# Patient Record
Sex: MALE | Race: WHITE | Employment: FULL TIME | URBAN - METROPOLITAN AREA
[De-identification: names, ages, dates, MRNs, and addresses within clinical notes are randomized per-mention and may not be internally consistent; named-entity substitution may affect disease eponyms.]

---

## 2017-02-08 DIAGNOSIS — H90.3 SENSORINEURAL HEARING LOSS, BILATERAL: Primary | ICD-10-CM

## 2017-02-22 ENCOUNTER — OFFICE VISIT (OUTPATIENT)
Dept: AUDIOLOGY | Facility: CLINIC | Age: 57
End: 2017-02-22

## 2017-02-22 DIAGNOSIS — H90.3 SENSORY HEARING LOSS, BILATERAL: Primary | ICD-10-CM

## 2017-02-22 NOTE — MR AVS SNAPSHOT
After Visit Summary   2017    Mark Adkins    MRN: [id number]           Patient Information     Date Of Birth          1960        Visit Information        Provider Department      2017 8:00 AM Rosangela Rojas AuD M [facility] Audiology         Follow-ups after your visit        Your next 10 appointments already scheduled     May 24, 2017  8:00 AM CDT   Cochlear Implant Return with Kenna Garner [facility]iology ([facility])    [address] [place] [address] [phone]              Who to contact     Please call your clinic at [phone] to:    Ask questions about your health    Make or cancel appointments    Discuss your medicines    Learn about your test results    Speak to your doctor   If you have compliments or concerns about an experience at your clinic, or if you wish to file a complaint, please contact [facility] Patient Relations at [phone] or email us at [email or web address]         Additional Information About Your Visit        MyChart Information     Rewardable is an electronic gateway that provides easy, online access to your medical records. With Rewardable, you can request a clinic appointment, read your test results, renew a prescription or communicate with your care team.     To sign up for Rewardable visit the website at [email or web address]   You will be asked to enter the access code listed below, as well as some personal information. Please follow the directions to create your username and password.     Your access code is: [email or web address]  Expires: 2017  6:30 AM     Your access code will  in 90 days. If you need help or a new code, please contact your [facility] or call [phone] for assistance.        Care EveryWhere ID     This is your Care EveryWhere ID. This could be used by other organizations to access your [place]  medical records  [id number]         Blood Pressure from Last 3 Encounters:   No data found for BP    Weight from Last 3 Encounters:   No data found for Wt              Today, you had the following     No orders found for display       Primary Care Provider Office Phone # Fax #    Elkin Mcneal [phone]       [place] [address] [facility] [address]        Thank you!     Thank you for choosing [facility] AUDIOLOGY  for your care. Our goal is always to provide you with excellent care. Hearing back from our patients is one way we can continue to improve our services. Please take a few minutes to complete the written survey that you may receive in the mail after your visit with us. Thank you!             Your Updated Medication List - Protect others around you: Learn how to safely use, store and throw away your medicines at www.disposemymeds.org.      Notice  As of 2/22/2017  8:52 AM    You have not been prescribed any medications.

## 2017-02-22 NOTE — PROGRESS NOTES
AUDIOLOGY REPORT    BACKGROUND INFORMATION: Dr. Elkin Varghese implanted Mark Adkins with a left  Advanced Pileus Software 90k cochlear implant (CI) on 10/11/10 due to severe to profound sensorineural hearing loss bilaterally and lack of benefit from hearing aids.  Today's visit was ordered by Dr. Elkin Varghese.      PATIENT REPORT: Mark was seen in Audiology at the [facility] on 2/22/17 to upgrade to the Cat CI Q90 sound processor.  He did not select a ComPilot with his upgrade kit, as he preferred to have extra spare parts.  If he wishes to order a ComPilot or trade the spare parts for a ComPilot, he will contact [employer].      FITTING SESSION: The patient came to the clinic for adjustment to the programs in the external speech processor and for assessment of the external components of the cochlear implant system. These components provide power and data to the internal device. Sound is only heard once the external portion is activated. Postoperative treatment, including device fitting and adjustment, audiologic assessments, and training are required at regular intervals.     Processor type: Cat CI Q90 fit today.  [employer] and PSP will become backups.    Headpiece type: Universal headpiece for Cat CI - 1 magnet, HR 90K for More[employer] -standard magnet, and PHP for PHP - 2 magnets, no irritation from magnets    TEST RESULTS:   Electrode Impedances: stable and within tolerances (electrodes 1 & 16 are off)  Neural Response Testing: Did not test  Facial Stimulation: Absent  Tinnitus: Absent  Balance Problems: Absent  Pain/Discomfort: Absent  Strategies: HiResolution S 120 in Morehouse and PSP, Tolar S in Cat CI Q90  Clear Voice medium in More[place] and Cat (not available in PSP).     Programs in Cat CI Q90 (Tolar S):  1. (75) Everyday program, IDR 65, Tmic only   2.  (76) AutoUltraZoom, IDR 65, Tmic as startup/default  3. (77) Manual UltraZoom, IDR 65, Processor  colt  4. (78) EchoBlock, IDR 60, Tmic only  All programs use Clear Voice medium and WindBlock.    Grounding is on ring band as carried over from [employer] (recommendations from [employer]).    All programs use manual RF of 5 and APWII pulse width (previously manual pulse width but sounded better with APWII today).    No changes were made to the Johnson or PSP processors today.     COMMENTS:     The preferred Johnson program #65 was converted to the Cat CI Q90 (colt only).  Most comfortable (M) levels were measured on electrodes 2-15 (1 & 16 are off) using speech bursts in the Cat CI Q90.  M levels were reshaped and they decreased slightly in the middle to high frequencies.  IDR was increased from 60 to 65 in the main programs but was left at 60 for the EchoBlock program.  WindBlock was added to all programs.  Patient had high power mode in his Liqueo.  This is not available in Cat CI Q90 and some intermittencies were noted.  Therefore, RF was set to manual level of 5 and no intermittencies occurred.    All of the equipment in the Cat CI Q90 processor kit was reviewed.  The patient was given time to practice use of the device and placement/removal of processor.  The patient's questions were answered.  Equipment was registered and warranties were reviewed.        SUMMARY AND RECOMMENDATIONS: Mark was seen for activation of his Cat CI Q90 sound processor today.  He will return in 2-3 months for follow up so we can remove any programs he is not using and can reprogram as necessary based on sound quality reports.  He will also complete speech perception testing at that time.  In the meantime, if the patient would like a ComPilot, he will contact [employer] to order one or to exchange his extra spare parts for a ComPilot.  We have asked [employer] to exchange the three 5.5 inch cables that came in his upgrade kit for 3.5 inch cables.  These will ship directly to the patient in the next few days.       The patient expressed understanding and agreement with this plan.    Rachel Banegas M.A.  Audiology Extern  Temporary License #[id number]     I was present with the patient for the entire Audiology appointment including all procedures/testing performed by the AuD student, and agree with the student s assessment and plan as documented.    Roma Robbins, CCC-A  Licensed Audiologist  [place] #[id number]

## 2017-05-24 ENCOUNTER — OFFICE VISIT (OUTPATIENT)
Dept: AUDIOLOGY | Facility: CLINIC | Age: 57
End: 2017-05-24

## 2017-05-24 DIAGNOSIS — H90.3 SENSORY HEARING LOSS, BILATERAL: Primary | ICD-10-CM

## 2017-05-24 NOTE — MR AVS SNAPSHOT
After Visit Summary   2017    Mark Adkins    MRN: [id number]           Patient Information     Date Of Birth          1960        Visit Information        Provider Department      2017 8:00 AM Rosangela Rojas, Kenna GILES [facility] Audiology        Today's Diagnoses     Sensory hearing loss, bilateral    -  1       Follow-ups after your visit        Follow-up notes from your care team     Return in about 1 year (around 2018) for CIR 90.      Who to contact     Please call your clinic at [phone] to:    Ask questions about your health    Make or cancel appointments    Discuss your medicines    Learn about your test results    Speak to your doctor   If you have compliments or concerns about an experience at your clinic, or if you wish to file a complaint, please contact [facility] Patient Relations at [phone] or email us at [email or web address]         Additional Information About Your Visit        MyChart Information     Extreme Realityt is an electronic gateway that provides easy, online access to your medical records. With [employer], you can request a clinic appointment, read your test results, renew a prescription or communicate with your care team.     To sign up for Extreme Realityt visit the website at [email or web address]   You will be asked to enter the access code listed below, as well as some personal information. Please follow the directions to create your username and password.     Your access code is: [email or web address]  Expires: 2017  6:30 AM     Your access code will  in 90 days. If you need help or a new code, please contact your [facility] or call [phone] for assistance.        Care EveryWhere ID     This is your Care EveryWhere ID. This could be used by other organizations to access your [place] medical records  [id number]         Blood Pressure from Last 3 Encounters:   No data found for BP     Weight from Last 3 Encounters:   No data found for Wt              We Performed the Following     AUDIOGRAM/TYMPANOGRAM - INTERFACE     Audiometry/Air   (55699)     Eval of Aud Rehab Status (60 min)   (01351)     LT: Diagnostic Analysis of CI 7 yrs & over, Subsequent Programming   (96177)     Tympanometry (impedance - testing) (98341)        Primary Care Provider Office Phone # Fax #    Elkin Mcneal [phone]       [address] [address]        Thank you!     Thank you for choosing [facility] AUDIOLOGY  for your care. Our goal is always to provide you with excellent care. Hearing back from our patients is one way we can continue to improve our services. Please take a few minutes to complete the written survey that you may receive in the mail after your visit with us. Thank you!             Your Updated Medication List - Protect others around you: Learn how to safely use, store and throw away your medicines at www.disposemymeds.org.      Notice  As of 5/24/2017  9:03 AM    You have not been prescribed any medications.

## 2017-05-24 NOTE — PROGRESS NOTES
AUDIOLOGY REPORT    BACKGROUND INFORMATION: Dr. Elkin Varghese implanted Mark Adkins with a left  Advanced [employer]onics HiRes 90k cochlear implant (CI) on 10/11/10 due to severe to profound sensorineural hearing loss bilaterally and lack of benefit from hearing aids.  Today's visit in Audiology at the [facility] was ordered by Dr. Elkin Varghese.      PATIENT REPORT: Mark is here on 5/24/17 for cochlear implant programming and speech perception testing after upgrading to the Cat CI Q90 processor.  He has adjusted well but does not like the autoUltraZoom program and would like it removed.  He has been increasing the start up volume 3-4 levels.      He returned the Hootsuite battery today.      METHOD: Speech perception testing is conducted at regular intervals to determine the degree of benefit the patient is obtaining from the cochlear implant. Tests are conducted using the cochlear implant without the benefit of lipreading. All tests are conducted in a sound-treated room. Perception of monosyllabic words and words in sentences are tested. Results usually show improvement over time. A decrease in performance indicates the need for re-programming of the prosthesis and/or replacement of components.     INTERVAL: 6 1/2 years     TEST RESULTS:  40 minutes were spent assessing the patient s auditory rehabilitation status.    Device(s) used for Testing:   Right ear: Not applicable - patient's behind the ear hearing aid is broken and he hasn't had time to have it repaired at [employer].  He hasn't worn it in 2 years.  In the past, he was encouraged to have it repaired and resume use to keep the ear stimulated.   Patient is not interested in 2nd CI due to his issues recovering from surgeries.  The hearing aid may help in noise.    Left ear: Cat CI Q90 behind-the-ear sound processor - Microphone filters were changed.  Normal listening check.  [facility] had 1 magnet  upon arrival and increased to 1 standard + 1 thin in the inside well (none in outside well) based on patient's concerns about retention.  He was advised to monitor skin for signs of irritation or discomfort.      Soundfield Aided Thresholds: Detection in normal to mild loss range with cochlear implant.      Unaided: Right stable severe loss - known sensorineural so did not retest bone; Left no response (profound loss, known sensorineural)     Tympanograms: normal eardrum mobility bilaterally    CNC Words Test:  The patient repeats 25 single syllable words, auditory only. The words are presented at 60 dB A (conversational level) delivered from a CD player.    Preoperative Performance:  Bilaterally aided: 6%    3 months: 44%  3 years: 68% CI, 60% Bimodal  4 years: 80% CI, Did not test (DNT) bimodal as hearing aid is broken  5 years: 92% CI, DNT bimodal as hearing aid is broken  6 1/2 years (today): 96% CI, DNT bimodal as hearing aid is broken    The Hearing in Noise Test (HINT):  The patient repeats 20 sentences, auditory only.   The sentences are presented in each condition at 60 dB A (conversational level) delivered from a CD player.     Preoperative Performance:  Bilaterally aided: 3%    3 months: Cochlear implant - 87%  1-1/2 years (when upgraded to Clear Voice): CI - 95% in quiet, 21% without Clear Voice & 30% with Clear Voice at +5 dB signal to noise ratio (SNR) - noise conditions with continuous speech shaped noise  3 years: CI - 94% quiet, 92% +10 dB SNR, 26% +5 dB SNR; Bimodal - 66% +5 dB SNR (all with Clear Voice) - noise conditions with continuous speech shaped noise  4 years: CI - 98% quiet, 93% +10 dB SNR, 49% +5 dB SNR - noise conditions with gated CD noise; Bimodal - DNT since hearing aid is broken.  5 years: CI - 96% quiet; Bimodal - DNT since hearing aid is broken.  Did not test noise as switched to AzBio, a more difficult test.  6 1/2 years (today): Did not test - used AzBio (see below)    AzBio  Sentences Test: The patient repeats 20 sentences, auditory only. The sentences are presented at 60 dB A (conversational level) delivered from a CD player.     Preoperative Performance: Did not test    5 years: 85% quiet; 47% +10 dB SNR  6 1/2 years (today): 85% quiet; 69% +10 dB SNR      FITTING SESSION: The patient came to the clinic for adjustment to the programs in the external speech processor and for assessment of the external components of the cochlear implant system. These components provide power and data to the internal device. Sound is only heard once the external portion is activated. Postoperative treatment, including device fitting and adjustment, audiologic assessments, and training are required at regular intervals.     Processor type: Cat CI Q90.  [employer] and PSP are backups.    Headpiece type: Universal headpiece for Cat CI - 1 standard + 1 thin magnet in inside well (none in outside well), HR 90K for [place] -standard magnet, and PHP for PHP - 2 magnets, no irritation from magnets    TEST RESULTS:   Electrode Impedances: stable and within tolerances (electrodes 1 & 16 are off)  Neural Response Testing: Did not test  Facial Stimulation: Absent  Tinnitus: Absent  Balance Problems: Absent  Pain/Discomfort: Absent  Strategies: HiResolution S 120 in [place] and PSP, Omro S in Cat CI Q90  Clear Voice medium in [place] and Cat (not available in PSP).     Programs in Cat CI Q90 (Omro S):  1. (82) Everyday program, IDR 65, Tmic only   2. (83) Manual UltraZoom, IDR 65, Processor colt  3. (84) EchoBlock, IDR 60, Tmic only  All programs use Clear Voice medium and WindBlock.    Grounding is on ring band as carried over from [employer] (recommendations from [employer]).    All programs use manual RF of 5 and APWII pulse width (previously manual pulse width but sounded better with APWII today).    No changes were made to the Phoenix or PSP processors today.     COMMENTS:   Patient reports he  increases start up volume about 3-4 levels (5-6%).  Therefore, most comfortable (M) levels were globally increased 8 units to achieve comfort.  This resulted in slightly higher pulse width.  Volume control range was increased from a maximum change of 15% to 25%.  The autoUltraZoom program was removed from position #2 and the other programs were moved up in position.      Patient did not bring backup processors for updating today.     SUMMARY AND RECOMMENDATIONS: Mark was seen speech perception testing at 6 1/2 years post-activation of his cochlear implant.  Scores are stable and well above average.    Bimodal testing was not completed today due to a broken hearing aid.  Patient was encouraged to have his aid repaired and to resume use to help in more challenging environments.  He isn't interested in a 2nd CI.  Testing with the CI (and bimodal if patient is aided) should be completed again in 1-2 years.      Programming changes were made in the Cat CI Q90 to increase start up volume and volume control range, to increase pulse width and to remove the autoUltraZoom.  Magnet strength was increased and patient will monitor skin.      Unaided hearing in the right ear is stable.  There is no measurable hearing in the left/implanted ear.   Middle ear status is normal bilaterally.  Patient reports cochlear implant position seems to have stabilized.  He will follow up with Dr. Varghese for any changes or concerns.      The patient expressed understanding and agreement with this plan.      Roma Robbins, CCC-A  Licensed Audiologist  [place] #[id number]     Enclosure: audiogram

## 2018-11-30 ENCOUNTER — HOSPITAL ENCOUNTER (EMERGENCY)
Facility: CLINIC | Age: 58
Discharge: HOME | End: 2018-11-30
Attending: EMERGENCY MEDICINE | Admitting: EMERGENCY MEDICINE
Payer: OTHER MISCELLANEOUS

## 2018-11-30 VITALS
TEMPERATURE: 97.9 F | OXYGEN SATURATION: 94 % | HEART RATE: 52 BPM | DIASTOLIC BLOOD PRESSURE: 93 MMHG | SYSTOLIC BLOOD PRESSURE: 158 MMHG

## 2018-11-30 DIAGNOSIS — T30.4 CHEMICAL BURN: ICD-10-CM

## 2018-11-30 PROCEDURE — 99283 EMERGENCY DEPT VISIT LOW MDM: CPT

## 2018-11-30 RX ORDER — ATENOLOL 50 MG/1
50 TABLET ORAL DAILY
COMMUNITY
Start: 2013-12-23

## 2018-11-30 RX ORDER — OFLOXACIN 3 MG/ML
1 SOLUTION/ DROPS OPHTHALMIC EVERY 4 HOURS
Qty: 1 BOTTLE | Refills: 0 | Status: SHIPPED | OUTPATIENT
Start: 2018-11-30

## 2018-11-30 ASSESSMENT — ENCOUNTER SYMPTOMS
EYE PAIN: 1
WOUND: 1

## 2018-11-30 ASSESSMENT — VISUAL ACUITY
OD: 20/25
OS: 20/25
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
## 2018-11-30 NOTE — ED AVS SNAPSHOT
Emergency Department    [address] [place] [address]    Phone:  [phone]    Fax:  [phone]                                       Mark Adkins   MRN: [id number]    Department:   Emergency Department   Date of Visit:  11/30/2018           After Visit Summary Signature Page     I have received my discharge instructions, and my questions have been answered. I have discussed any challenges I see with this plan with the nurse or doctor.    ..........................................................................................................................................  Patient/Patient Representative Signature      ..........................................................................................................................................  Patient Representative Print Name and Relationship to Patient    ..................................................               ................................................  Date                                   Time    ..........................................................................................................................................  Reviewed by Signature/Title    ...................................................              ..............................................  Date                                               Time          22EPIC Rev 08/18
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
## 2019-07-08 ENCOUNTER — TELEPHONE (OUTPATIENT)
Dept: AUDIOLOGY | Facility: CLINIC | Age: 59
End: 2019-07-08

## 2019-07-08 NOTE — TELEPHONE ENCOUNTER
Received email from patient that Cat CI Q90 processor #[id number] will not power on (no flashing lights) even after use of dryer.  I submitted RMA [id number].  Replacement will ship directly to patient.  He will return faulty processor to [employer].  In the meantime, he is using backup processor.       Roma Robbins, CCC-A  Licensed Audiologist  [place] #[id number]

## 2019-09-03 ENCOUNTER — TELEPHONE (OUTPATIENT)
Dept: AUDIOLOGY | Facility: CLINIC | Age: 59
End: 2019-09-03

## 2019-09-03 DIAGNOSIS — H90.3 SENSORY HEARING LOSS, BILATERAL: Primary | ICD-10-CM

## 2019-09-03 NOTE — TELEPHONE ENCOUNTER
Order requested from Dr. Quach's clinic today.      Roma Robbins, CCC-A  Licensed Audiologist  [place] #[id number]

## 2019-09-03 NOTE — TELEPHONE ENCOUNTER
[facility] Call Center    Phone Message    May a detailed message be left on voicemail: yes    Reason for Call: Order(s): Other:   Reason for requested: Hearing Evaluation. Pt is looking to schedule a hearing test so that Pt get an hearing aid for the right ear. Please follow up with Pt.   Date needed: ASAP  Provider name: Dr. Rojas      Action Taken: Message routed to:  [facility] ([facility] Audiology

## 2019-09-04 NOTE — TELEPHONE ENCOUNTER
Patient notified that order is now in place.  He will call to schedule appt.       Roma Robbins, CCC-A  Licensed Audiologist  [place] #[id number]

## 2019-09-05 ENCOUNTER — TELEPHONE (OUTPATIENT)
Dept: AUDIOLOGY | Facility: CLINIC | Age: 59
End: 2019-09-05

## 2019-09-05 NOTE — TELEPHONE ENCOUNTER
[facility] Call Center    Phone Message    May a detailed message be left on voicemail: yes    Reason for Call: Other: Patient is requesting an appointment before he see Rosangela Rojas on  10/03. He is currently scheduled for an HAE on 11/07 but he is wondering if he can see an audiologist before 10/03 when he sees Rosangela Rojas. Please contact patient to set up an earlier appointment for him//Thanks.    Action Taken: Message routed to:  [facility] audiology

## 2019-09-10 NOTE — TELEPHONE ENCOUNTER
Patient is being offered 9/16/19 at 8am with Ashtyn George for hearing aid evaluation.      Roma Robbins, CCC-A  Licensed Audiologist  [place] #[id number]

## 2019-09-28 NOTE — PROGRESS NOTES
AUDIOLOGY REPORT    BACKGROUND INFORMATION: Dr. Elkin Varghese implanted Mark Adkins with a left  Advanced MasteryConnects HiRes 90k cochlear implant (CI) on 10/11/10 due to severe to profound sensorineural hearing loss bilaterally and lack of benefit from hearing aids.  Today's visit in Audiology at the [facility] was ordered by Dr. Samara Quach.      PATIENT REPORT: Mark is here on 10/3/19 for cochlear implant programming and speech perception testing.  We discussed bimodal options and he has a hearing aid consultation on 11/7/19 to discuss ordering a Cat Link hearing aid.   Patient is not interested in 2nd CI due to his issues recovering from surgeries.  He understands that a hearing aid may help with sound quality, localization and potentially hearing in noise but may not provide a significant change in speech understanding given his poor word recognition in the right ear.       METHOD: Speech perception testing is conducted at regular intervals to determine the degree of benefit the patient is obtaining from the cochlear implant. Tests are conducted using the cochlear implant without the benefit of lipreading. All tests are conducted in a sound-treated room. Perception of monosyllabic words and words in sentences are tested. Results usually show improvement over time. A decrease in performance indicates the need for re-programming of the prosthesis and/or replacement of components.     INTERVAL: 9 years     TEST RESULTS:  35 minutes were spent assessing the patient s auditory rehabilitation status.    Device(s) used for Testing:   Right ear: Not currently aided  Left ear: Cat CI Q90 behind-the-ear sound processor - Microphone filters were changed.  Normal listening check.  UHP had 1 standard + 1 thin in the inside well (none in outside well) based on patient's concerns about retention.  He was advised to monitor skin for signs of irritation or discomfort.  None noted  today.      Soundfield Aided Thresholds: Detection in normal to moderate loss range with cochlear implant.  Re: 5/24/17 10 dB decrease 8664-8548 Hz which did not improve by changing the Tmic2.      Unaided: Right stable severe sensorineural hearing loss with 0% word recognition; Left did not retest since no response in past (profound loss, known sensorineural)     Tympanograms: normal eardrum mobility bilaterally    CNC Words Test:  The patient repeats 25 single syllable words, auditory only. The words are presented at 60 dB A (conversational level) delivered from a CD player.    Preoperative Performance:  Bilaterally aided: 6%    3 months: 44%  3 years: 68% CI, 60% Bimodal  4 years: 80% CI, Did not test (DNT) bimodal as hearing aid is broken  5 years: 92% CI, DNT bimodal as hearing aid is broken  6 1/2 years: 96% CI, DNT bimodal as hearing aid is broken  9 years (today): 92% CI, DNT bimodal as right ear isn't aided    The Hearing in Noise Test (HINT):  The patient repeats 20 sentences, auditory only.   The sentences are presented in each condition at 60 dB A (conversational level) delivered from a CD player.     Preoperative Performance:  Bilaterally aided: 3%    3 months: Cochlear implant - 87%  1-1/2 years (when upgraded to Clear Voice): CI - 95% in quiet, 21% without Clear Voice & 30% with Clear Voice at +5 dB signal to noise ratio (SNR) - noise conditions with continuous speech shaped noise  3 years: CI - 94% quiet, 92% +10 dB SNR, 26% +5 dB SNR; Bimodal - 66% +5 dB SNR (all with Clear Voice) - noise conditions with continuous speech shaped noise  4 years: CI - 98% quiet, 93% +10 dB SNR, 49% +5 dB SNR - noise conditions with gated CD noise; Bimodal - DNT since hearing aid is broken.  5 years: CI - 96% quiet; Bimodal - DNT since hearing aid is broken.  Did not test noise as switched to AzBio, a more difficult test.  6 1/2 years: Did not test - used AzBio (see below)  9 years (today): Did not test - used AzBio  (see below)    AzBio Sentences Test: The patient repeats 20 sentences, auditory only. The sentences are presented at 60 dB A (conversational level) delivered from a CD player.     Preoperative Performance: Did not test    5 years: 85% quiet; 47% +10 dB SNR  6 1/2 years: 85% quiet; 69% +10 dB SNR  9 years (today): 87% quiet; 62% +10 dB SNR    FITTING SESSION: The patient came to the clinic for adjustment to the programs in the external speech processor and for assessment of the external components of the cochlear implant system. These components provide power and data to the internal device. Sound is only heard once the external portion is activated. Postoperative treatment, including device fitting and adjustment, audiologic assessments, and training are required at regular intervals.     Processor type: Cat CI Q90.  Booklr and PSP are backups.    Headpiece type: Universal headpiece for Cat CI - 1 standard + 1 thin magnet in inside well (none in outside well), HR 90K for [place] -standard magnet, and PHP for PHP - 2 magnets, no irritation from magnets    TEST RESULTS:   Electrode Impedances: stable and within tolerances (electrodes 1 & 16 are off) - Note: Because the patient's programs use ring band grounding, it needs to be changed to case band just for impedance measurements and then changed back to ring band for programming.    Dataloggin.7 hours of use per day  Neural Response Testing: Did not test  Facial Stimulation: Absent  Tinnitus: Absent  Balance Problems: Absent  Pain/Discomfort: Absent  Strategies: HiResolution S 120 in [place] and PSP, [place] S in Cat CI Q90  Clear Voice medium in [place] and Cat (not available in PSP).     Programs in Cat CI Q90 ([place] S):  1. (85) Everyday program, IDR 65, Tmic only   2. (85) Manual UltraZoom, IDR 65, Processor colt  3. (85) EchoBlock, IDR 65, Tmic only  All programs use Clear Voice medium and WindBlock.    Grounding is on ring band as carried over  from [employer]o (recommendations from [employer]).    All programs use manual RF of 5 and APWII pulse width  [employer] is active for program changes (not volume).      COMMENTS:   Cat CI Q90 was reset for bimodal use.  Pairing ID [id number] (see scanned printout of settings).  Most comfortable (M) levels were readjusted using loudness scaling.  They were stable except on the 2 most basal active electrodes which increased.  This is consistent with what would be recommended based on soundfield thresholds.  Patient reports feeling a strange sensation on the 3 most basal active electrodes during mapping but didn't like the sound quality with those electrodes turned off in his maps.  Will monitor to see if this occurs in live speech.      Patient did not bring backup processors for updating today.     Patient received a 3.5 inch brown cable from [employer] but wanted a 4.25 inch beige cable.  [employer] was called today and an exchange was initiated.  New cable will ship directly to the patient.      SUMMARY AND RECOMMENDATIONS: Mark was seen speech perception testing at 9 years post-activation of his left cochlear implant.  Scores are stable and well above average.    High frequency audibility decreased and programming changes were made.  The Cat CI was also reset to recognize a Cat Link hearing aid (pairing ID [id number]).  Patient will see Izabella Pierson to discuss getting a right Cat Link hearing aid in about 1 month and will be fit by Kylah Pierson.  Patient reports he may be interested in getting a ComPilot and knows only the ComPilot I (patient can purchase from [employer]) would be compatible with the Cat CI Q90.  Testing with the CI (and bimodal if patient is aided) should be completed again in 1-2 years.  Patient will return sooner as needed for programming.      Unaided hearing in the right ear is stable.  There is no measurable hearing in the left/implanted ear.   Middle ear  status is normal bilaterally.  Patient reports cochlear implant position seems to have stabilized.  He will follow up in ENT Clinic for any changes or concerns.      The patient expressed understanding and agreement with this plan.        Roma Robbins, CCC-A  Licensed Audiologist  [place] #[id number]      Enclosure: audiogram    Cc Izabella Pierson   Cc Kylah Pierson

## 2019-09-30 ENCOUNTER — HEALTH MAINTENANCE LETTER (OUTPATIENT)
Age: 59
End: 2019-09-30

## 2019-10-03 ENCOUNTER — OFFICE VISIT (OUTPATIENT)
Dept: AUDIOLOGY | Facility: CLINIC | Age: 59
End: 2019-10-03
Payer: COMMERCIAL

## 2019-10-03 DIAGNOSIS — H90.3 SENSORY HEARING LOSS, BILATERAL: Primary | ICD-10-CM

## 2019-11-07 ENCOUNTER — OFFICE VISIT (OUTPATIENT)
Dept: AUDIOLOGY | Facility: CLINIC | Age: 59
End: 2019-11-07
Attending: OTOLARYNGOLOGY
Payer: COMMERCIAL

## 2019-11-07 DIAGNOSIS — H90.3 SENSORINEURAL HEARING LOSS, BILATERAL: Primary | ICD-10-CM

## 2019-11-07 NOTE — PROGRESS NOTES
AUDIOLOGY REPORT    SUBJECTIVE: Mark Adkins is a 59 year old male who was seen in the Audiology Clinic at [facility] on 11/7/2019 for a hearing aid evaluation. The patient was implanted with a left Advanced Stix Gamess HiRes 90k cochlear implant (CI) by Elkin Varghese M.D. on 10/11/2010 due to severe to profound sensorineural hearing loss bilaterally and lack of benefit from hearing aids. He is followed by Roma Steiner for his left CI. He was seen on 10/3/2019, and results indicated severe sensorineural hearing loss for the right ear with 0% word recognition. The left ear was not tested due to previous no response. Mark is interested in a right hearing aid to improve sound quality, localization, and potentially hearing in noise. He understands that it will likely not provide a significant change in speech understanding given his poor word recognition in the right ear.    OBJECTIVE: The patient is a hearing aid candidate for the right ear. The patient would like to move forward with a hearing aid evaluation today. Discussed the compatibility of the Phonak Cat Link hearing aid with his Cat CI Q90 processor. Mark asked about the ComPilot as well. Explained that only the ComPilot I is compatible with his Cat CI Q90 processor and must be purchased through [employer].       The hearing aid mutually chosen was:  RIGHT: Phonak Cat Link  COLOR: Sand Beige  BATTERY SIZE: 675  EARMOLD/TIPS: Half shell earmold    Otoscopy revealed ears are clear of cerumen bilaterally. A right earmold impression was taken without incident.    ASSESSMENT: Reviewed purchase information and warranty information with the patient. The 45 day trial period was explained to the patient. The patient was given a copy of the [place] Department of Health consumer brochure on purchasing hearing instruments. Patient risk factors have been provided to the patient in writing prior to the sale  of the hearing aid per FDA regulation. The risk factors are also available in the User Instructional Booklet to be presented on the day of the hearing aid fitting. Hearing aid ordered. Hearing aid evaluation completed.    PLAN: Mark is scheduled to return in 2-3 weeks for a hearing aid fitting with Roma Alexis (Pairing ID [id number] - See the settings printout that was scanned into the patient's chart). Purchase agreement will be completed on that date. He will look into purchasing the ComPilot I through [employer]. Please contact this clinic with any questions or concerns.      Roma Montiel, [facility]  Licensed Audiologist  [place] #[id number]      CC: Roma Alexis Au.D.

## 2019-11-14 ENCOUNTER — RECORDS - HEALTHEAST (OUTPATIENT)
Dept: ADMINISTRATIVE | Facility: OTHER | Age: 59
End: 2019-11-14

## 2019-11-21 ENCOUNTER — HOSPITAL ENCOUNTER (OUTPATIENT)
Dept: CT IMAGING | Facility: CLINIC | Age: 59
Discharge: HOME | End: 2019-11-21
Attending: PHYSICAL MEDICINE & REHABILITATION

## 2019-11-21 DIAGNOSIS — M54.12 CERVICAL RADICULAR PAIN: ICD-10-CM

## 2019-11-25 ENCOUNTER — OFFICE VISIT (OUTPATIENT)
Dept: AUDIOLOGY | Facility: CLINIC | Age: 59
End: 2019-11-25
Payer: COMMERCIAL

## 2019-11-25 DIAGNOSIS — H90.3 SENSORINEURAL HEARING LOSS, BILATERAL: Primary | ICD-10-CM

## 2019-11-25 NOTE — PROGRESS NOTES
AUDIOLOGY REPORT    SUBJECTIVE: Mark Adkins is a 59 year old male who was seen in the Audiology Clinic at the [facility] for a fitting of a Phonak Cat Link UP BTE hearing aid and earmold for his right ear. Previous results have revealed a bilateral severe right and profound left sensorineural hearing loss. The patient was implanted with a left [employer]ics HiRes 90k cochlear implant (CI) by Elkin Varghese M.D. on 10/11/2010 due to severe to profound sensorineural hearing loss bilaterally and lack of benefit from hearing aids. He is followed by Roma Steiner for his left CI. He reports significant listening problems in background noise.  He just received the ComPilot in the mail from [employer] today.    OBJECTIVE: The hearing aid conformity evaluation was completed.The hearing aid earmold was placed and they provided a good fit. Real-ear-probe-microphone measurements were completed on the Estrela Digital system and were a good match to NAL-NL1 target with soft sounds audible, moderate sounds comfortable, and loud sounds below discomfort. UCLs are verified through maximum power output measures and demonstrate appropriate limiting of loud inputs. Mark was oriented to proper hearing aid use, care, cleaning (no water, dry brush), batteries (size 675, insertion/removal, toxicity, low-battery signal), aid insertion/removal, user booklet, warranty information, storage cases, and other hearing aid details. The patient confirmed understanding of hearing aid use and care, and showed proper insertion of hearing aid and batteries while in the office today.Mark reported good volume and sound quality today.   Hearing aids were programmed as follows:  Program 1:Autosound  Program 2:  UltraZoom  Program 3:  Reverberrant  Program 4:  StereoZoom    He was also connected to programming software to add a StereoZoom program as he wants to try it in connection with the hearing aid.   Both the  hearing aid and processor were connected to the ComPilot, and were coordinated with for programs.    The ComPilot was paired with his phone, and he was given instructions in its use.  EARS FIT: Right  HEARING AID MODEL NAME: Phonak Cat UP  HEARING AID STYLE: Behind-the-ear  EARMOLDS/TIP: Luli #8 otoblast  SERIAL NUMBERS: Right: [id number]   WARRANTY END DATE: 11/25/2020    ASSESSMENT: A right Phonak Link Cat UP hearing aid and earmold were fit today. Verification measures were performed. Mark signed the Hearing Aid Purchase Agreement and was given a copy, as well as details on his hearing aids. Patient was counseled that exact out of pocket amounts cannot be determined for hearing aid claims being sent to insurance. Any insurance coverage information presented to the patient is an estimate only, and is not a guarantee of payment. Patient has been advised to check with their own insurance.    PLAN:Mark will return for follow-up in 2-3 weeks for a hearing aid review appointment.  As it is a one year warranty, he may pay for the extension at the time of the initial review, but wanted to confer with his wife.  Also at that time, once the warranty is decided, the registration form should be completed and sent to [employer]. The Rk Select and Connect were discussed, but he reports the cost is prohibitive at this time.Please call this clinic with questions regarding today s appointment.      Roma Cardenas, CCC-A  Licensed Audiologist  [place] #[id number]    CC:  [facility]

## 2019-11-25 NOTE — Clinical Note
Izabella, once he decides on the warranty, please complete the registration and fax to [employer].  He likes the sound but it may be slightly too loud.  He wants to try as it matched Aleks, I added a stereozoom program as he reports lots of problems in noise, and he wanted to try it.

## 2019-12-16 ENCOUNTER — OFFICE VISIT (OUTPATIENT)
Dept: AUDIOLOGY | Facility: CLINIC | Age: 59
End: 2019-12-16
Payer: COMMERCIAL

## 2019-12-16 DIAGNOSIS — H90.3 SENSORINEURAL HEARING LOSS, BILATERAL: Primary | ICD-10-CM

## 2019-12-16 NOTE — PROGRESS NOTES
AUDIOLOGY REPORT    SUBJECTIVE: Mark Adkins is a 59 year old male who was seen in the Audiology Clinic at [facility] on 12/16/2019 for a follow-up check regarding the fitting of a new hearing right aid. The patient was implanted with a left Advanced Bionics HiRes 90k cochlear implant (CI) by Elkin Varghese M.D. on 10/11/2010 due to severe to profound sensorineural hearing loss bilaterally and lack of benefit from hearing aids. He is followed by Roma Steiner for his left CI. He was seen on 10/3/2019, and results indicated severe sensorineural hearing loss for the right ear with 0% word recognition. The left ear was not tested due to previous no response. Mark was fit with a right Cat Link UP hearing aid with custom earmold on 11/25/2019. He reports the volume is still too loud, especially in noisy environments. He notes the right earmold does not stay in well, causing him to experience feedback often.    OBJECTIVE: The International Outcome Inventory-Hearing Aids (IOI-HA) was administered today.The patient s responses to the 7 questions can be compared to normative data relative to how others are performing with their hearing aids, as well as focusing audiologic care and counseling.This patient s Quality of Life score (Question 7) was 4, which is at normative average.     Based on patient report, the following changes were made: Overall volume was decreased by 3 dB. Mark was worried that the volume may still be too loud in noisy environments, so the range of the volume control was increased. The feedback test was re-run. Feedback was still noted. It was decided that the right earmold will be remade with the canal portion slightly longer/bulkier.     An electroacoustic analysis was performed at User Settings for future comparison.     Reviewed 45 day trial period, care, cleaning (no water, dry brush), batteries (size 675, insertion/removal, toxicity,  low-battery signal), aid insertion/removal, volume adjustment (if applicable), user booklet, storage cases, and other hearing aid details.     Mark decided he would like to add the second year extended warranty.    ASSESSMENT: A follow-up appointment for hearing aid fitting was completed today. IOI-HA administered. Changes to the hearing aid were made as outlined above. The right earmold will be remade. No charge hearing aid visit, as it is in warranty.     PLAN: Mark will return in 3 weeks for a right earmold fitting. The registration form for the right Cat Link UP hearing aid will be sent to [employer] with the second year extended warranty indicated. Mark understands that he will be charged $129 for the extended warranty. Please call this clinic with any questions regarding today s appointment.      Roma Montiel, [facility]  Licensed Audiologist  [place] #[id number]      CC: Roma Steiner

## 2020-01-03 NOTE — PROGRESS NOTES
"AUDIOLOGY REPORT    BACKGROUND INFORMATION: Mark Adkins, 59 year old male, was seen in the Audiology Clinic at [facility] on 1/6/2020 for a right earmold fitting.  The patient was implanted with a left Advanced Bionics HiRes 90k cochlear implant (CI) by Elkin Varghese M.D. on 10/11/2010 due to severe to profound sensorineural hearing loss bilaterally and lack of benefit from hearing aids. He is followed by Roma Steiner for his left CI. He was seen on 10/3/2019, and results indicated severe sensorineural hearing loss for the right ear with 0% word recognition. The left ear was not tested due to previous no response. Mark was fit with a right Cat Link UP hearing aid with custom earmold on 11/25/2019.     TEST RESULTS AND PROCEDURES:  Otoscopy revealed clear ear canals.  A right earmolds was fit onto the current aid and no significant feedback occurred.  Patient reported comfort and with jaw movements the earmold stayed in place.    The patient had a question regarding notifications he was hearing from his FERT[employer] EARTH SYSTEMS II. He reported that whenever the phone lock screen turned on or turned off he would get a bluetooth notification which was distracting.  A phone call was made to the  ([employer]) regarding this, they suggested turning off the \"Raise to Wake\", \"Lock sounds\", \"Haptic\" sounds, and \"Keyboard\" sounds.  All of these were disabled. The patient reported that he still heard the notifications.  The  suggested that he contact his phone carrier/iPhone support and see what suggestions they may have. It was difficulty to determine if the patient heard the notifications in his hearing aid, his cochlear implant, or both because after things were turned off he stated that he did not hear it in his hearing aid but then heard it again when he had his cochlear implant on, but then stated he was hearing it through his hearing aid.      After the " patient left, the notification issue was discussed with his managing cochlear implant audiologist, Dr. Rosangela Rojas, who thought that perhaps it was a voice control setting in his ComPilot II. She reports that she generally turns that off but perhaps it was activated again when he was fit with the new hearing aid.    SUMMARY AND RECOMMENDATIONS:  Patient was fit with a new right earmold, today's appointment is a no charge visit as the hearing aid is under warranty and the earmold was re-made under a warranty as well.  When the patient returns, the ComPilot II settings should be checked regarding voice control if the notifications keep happening after he contacts iPhone support.  Please call this clinic with questions regarding today's visit.      Kenna Martínez  Audiologist  [place] License  #[id number]

## 2020-01-06 ENCOUNTER — OFFICE VISIT (OUTPATIENT)
Dept: AUDIOLOGY | Facility: CLINIC | Age: 60
End: 2020-01-06
Payer: COMMERCIAL

## 2020-01-06 DIAGNOSIS — H90.3 SENSORINEURAL HEARING LOSS, BILATERAL: Primary | ICD-10-CM

## 2020-10-26 ENCOUNTER — TELEPHONE (OUTPATIENT)
Dept: AUDIOLOGY | Facility: CLINIC | Age: 60
End: 2020-10-26

## 2020-10-26 NOTE — TELEPHONE ENCOUNTER
[facility] [phone] was completed for patient's Cat CI Q90 processor.      Roma Robbins, CCC-A, [facility]  Licensed Audiologist  [place] #[id number]

## 2021-01-15 ENCOUNTER — HEALTH MAINTENANCE LETTER (OUTPATIENT)
Age: 61
End: 2021-01-15

## 2021-06-06 ENCOUNTER — TELEPHONE (OUTPATIENT)
Dept: AUDIOLOGY | Facility: CLINIC | Age: 61
End: 2021-06-06

## 2021-06-06 NOTE — TELEPHONE ENCOUNTER
Letter of medical necessity for Cat CI M90 processor was routed to Dr. Quach for signature.  Once signed, it will automatically route to [employer], who will assist with insurance authorization.      Roma Robbins, CCC-A, [facility]  Licensed Audiologist  [place] #[id number]

## 2021-07-12 DIAGNOSIS — H90.5 SNHL (SENSORINEURAL HEARING LOSS): Primary | ICD-10-CM

## 2021-09-14 NOTE — PROGRESS NOTES
AUDIOLOGY REPORT    BACKGROUND INFORMATION: Dr. Elkin Varghese implanted Mark Adkins with a left  Advanced Screenzs HiRes 90k cochlear implant (CI) on 10/11/10 due to severe to profound sensorineural hearing loss bilaterally and lack of benefit from hearing aids.  The patient is being seen in Audiology at the [facility] on 9/20/2021 for cochlear implant programming and equipment upgrade.  Today's visit was ordered by Dr. Samara Quach.      PATIENT REPORT: Mark is here for left Cat CI M90 fitting.    He selected a right Link M hearing aid as his accessory and has a fitting scheduled in October 2021 with Izabella Pierson.       FITTING SESSION: Dr. Samara Quach, cochlear implant surgeon, ordered today's appointment. The patient came to the clinic for adjustment to the programs in the external speech processor and for assessment of the external components of the cochlear implant system. These components provide power and data to the internal device. Sound is only heard once the external portion is activated. Postoperative treatment, including device fitting and adjustment, audiologic assessments, and training are required at regular intervals. Testing can include electropsychophysical measures of threshold, comfort, and loudness balancing, which are completed to update the program.    Processor type: Cat CI M90 fit today, Backup Cat CI Q90   Headpiece type: Slim HP in M90, UHP in Q90  Magnet strength: 1 In M90, 1 standard + 1 thin inside well for Q90    TEST RESULTS:   Electrode Impedances: Did not test - grounding is on ring band; This was changed to case band for impedance testing but software would still not measure impedance.  Will reattempt at next visit. Changed back to ring band for his programming.     Neural Response Testing: Did not test  Facial Stimulation: Absent  Tinnitus: Absent  Balance Problems: Absent  Pain/Discomfort: Absent  Strategies: Sarikaolution S  120 in [place] and P[place], [place] S in Cat CI Q90 and Cat CI M90  Clear Voice medium in [place] and Cat (not available in PSP).     Programs in Cat CI M90 ([place] S):  Start-up program: AutoSense  Manual programs:    1. Calm   2. Speech in noise  All programs use IDR 65, Clear Voice medium and WindBlock.    Grounding is on ring band as carried over from [employer] (recommendations from [employer]).    Programs in Cat CI Q90 ([place] S): Not changed today   1. (85) Everyday program, IDR 65, Tmic only   2. (85) Manual UltraZoom, IDR 65, Processor colt  3. (85) EchoBlock, IDR 65, Tmic only  All programs use Clear Voice medium and WindBlock.    Grounding is on ring band as carried over from Apmetrix (recommendations from [employer]).    All programs use manual RF of 5.     COMMENTS:   The Cat CI Q90 programs were converted from SoundWave into Target CI for use in the Cat CI M90 processor.  Upon going live in the M90, the volume was slightly too loud and most comfortable (M) levels were globally decreased 4 units to achieve comfort.   NOTE: We could initially not get lock with the primary slim HP but could with the backup.  After programming both slim HPs were working and the device check on the julio was normal.  Patient will report back if there are any equipment concerns.     All of the equipment in the processor kit was reviewed.  The processor was paired to the patient's phone and the AB remote julio.  The patient was given time to practice use of the device and placement/removal of processor.  The patient's questions were answered.  Warranties were reviewed.  Patient is aware that there is no loss coverage on the new equipment.      A right hearing test was completed.  Results revealed a severe/profound sensorineural hearing loss in the right ear.   Compared to testing from 10/3/2019, hearing was stable except for a 10 dB decrease at 4000 Hz.    Word recognition was not retested since it was previously  0%.   Left ear was not tested since there was no response in past.    Tympanograms: Normal bilaterally  Did not test acoustic reflexes due to left CI and severity of right loss.      SUMMARY AND RECOMMENDATIONS:  Mark was fit with a left Cat CI M90 processor today.  A right hearing test was completed.  Tympanograms were normal bilaterally.      Patient will return in October 2021 for follow up, impedance check, and speech perception testing with the new left M90 processor.  On the same day, he will see Kenna Rasheed for a a hearing aid fitting appointment for the right Link M hearing aid.      The patient expressed understanding and agreement with this plan.    Alicia Shea M.A.   Audiology Doctoral Student #[id number]    I was present with the patient for the entire Audiology appointment including all procedures/testing performed by the AuD student, and agree with the student s assessment and plan as documented.    Roma Robbins, CCC-A  Licensed Audiologist  [place] #[id number]    Enclosure: audiogram    Cc Izabella Pierson

## 2021-09-20 ENCOUNTER — OFFICE VISIT (OUTPATIENT)
Dept: AUDIOLOGY | Facility: CLINIC | Age: 61
End: 2021-09-20
Payer: COMMERCIAL

## 2021-09-20 DIAGNOSIS — H90.3 SENSORINEURAL HEARING LOSS, BILATERAL: Primary | ICD-10-CM

## 2021-09-20 PROCEDURE — 92603 COCHLEAR IMPLT F/UP EXAM 7/>: CPT | Mod: LT | Performed by: AUDIOLOGIST-HEARING AID FITTER

## 2021-09-20 PROCEDURE — 92567 TYMPANOMETRY: CPT | Mod: 59 | Performed by: AUDIOLOGIST-HEARING AID FITTER

## 2021-10-10 NOTE — PROGRESS NOTES
AUDIOLOGY REPORT    BACKGROUND INFORMATION: Dr. Elkin Varghese implanted Mark Adkins with a left  Advanced [place] [employer] HiRes 90k cochlear implant (CI) on 10/11/10 due to severe to profound sensorineural hearing loss bilaterally and lack of benefit from hearing aids.  The patient is being seen in Audiology at the [facility] on 10/13/2021 for cochlear implant follow up and testing.  Today's visit was ordered by Dr. Samara Quach.      PATIENT REPORT: Mark upgraded to the Cat CI M90 processor about 3-4 weeks ago.  Patient reports that he has been doing well with the new processor.  90% of the time he uses AutoSense and he hears well with it when streaming from his cell phone.  He does occasionally switch to the manual programs and would like to keep those.      The processor is tight over the top of his ear.  We discussed this might improve once he receives the curved batteries (currently backordered) but if it does not, he can consider putting moleskin on the bottom of the processor.      Patient declined any programming changes.      He selected a right Link M hearing aid as his accessory and has a fitting scheduled later this morning with Izabella Pierson.  Dr. Bowen can assist with linking the devices in the julio after the hearing aid fitting.        METHOD: Speech perception testing is conducted at regular intervals to determine the degree of benefit the patient is obtaining from the cochlear implant. Tests are conducted using the cochlear implant without the benefit of lipreading. All tests are conducted in a sound-treated room. Perception of monosyllabic words and words in sentences are tested. Results usually show improvement over time. A decrease in performance indicates the need for re-programming of the prosthesis and/or replacement of components.     INTERVAL: 11 years     TEST RESULTS:  35 minutes were spent assessing the patient s auditory rehabilitation  status.    Device(s) used for Testing:   Right ear: Did not test since patient is being fit with Cat Link M hearing aid later today   Left ear: Cat CI M90 behind-the-ear sound processor with 1 magnet (no irritation or discomfort)      Soundfield Aided Thresholds: Detection in normal to mild loss range with cochlear implant.  Stable re: 10/3/19     Unaided: Did not test - see results from 9/20/21    Tympanograms: Did not retest since they were normal bilaterally at 9/20/21 visit     CNC Words Test:  The patient repeats 25 single syllable words, auditory only. The words are presented at 60 dB A (conversational level) delivered from a CD player.    Preoperative Performance:  Bilaterally aided: 6%    3 months: 44%  3 years: 68% CI, 60% Bimodal  4 years: 80% CI, Did not test (DNT) bimodal as hearing aid is broken  5 years: 92% CI, DNT bimodal as hearing aid is broken  6 1/2 years: 96% CI, DNT bimodal as hearing aid is broken  9 years: 92% CI, DNT bimodal as right ear isn't aided  11 years (today): 84% CI, DNT bimodal today since patient is being fit with Cat Link M later today.     The Hearing in Noise Test (HINT):  The patient repeats 20 sentences, auditory only.   The sentences are presented in each condition at 60 dB A (conversational level) delivered from a CD player.     Preoperative Performance:  Bilaterally aided: 3%    3 months: Cochlear implant - 87%  1-1/2 years (when upgraded to Clear Voice): CI - 95% in quiet, 21% without Clear Voice & 30% with Clear Voice at +5 dB signal to noise ratio (SNR) - noise conditions with continuous speech shaped noise  3 years: CI - 94% quiet, 92% +10 dB SNR, 26% +5 dB SNR; Bimodal - 66% +5 dB SNR (all with Clear Voice) - noise conditions with continuous speech shaped noise  4 years: CI - 98% quiet, 93% +10 dB SNR, 49% +5 dB SNR - noise conditions with gated CD noise; Bimodal - DNT since hearing aid is broken.  5 years: CI - 96% quiet; Bimodal - DNT since hearing aid is  broken.  Did not test noise as switched to AzBio, a more difficult test.  6 1/2 years: Did not test - used AzBio (see below)  9 years: Did not test - used AzBio (see below)  11 years (today): Did not test - used AzBio (see below)    AzBio Sentences Test: The patient repeats 20 sentences, auditory only. The sentences are presented at 60 dB A (conversational level) delivered from a CD player.     Preoperative Performance: Did not test    CI only:   5 years: 85% quiet; 47% +10 dB SNR  6 1/2 years: 85% quiet; 69% +10 dB SNR  9 years: 87% quiet; 62% +10 dB SNR  11 years (today): 87% quiet; 59% +10 dB SNR    Bimodal:   11 years (today): Did not test since patient is getting new hearing aid immediately after this visit; Will assess at next annual testing.     SUMMARY AND RECOMMENDATIONS: Mark upgraded to a left Cat CI M90 processor about 3-4 weeks ago and is doing well with the new equipment.  Speech perception testing at 11 years post-activation of his left cochlear implant indicated that audibility and scores are stable and well above average.    Patient will see Izabella Pierson immediately after this visit to be fit with a right Cat Link M hearing aid.  Dr. Bowen can assist with bimodal pairing in the AB remote julio after the fitting.  Testing in the CI and bimodal conditions should be completed again in 1-2 years.  Impedances should be rechecked at that time.  Patient will return sooner as needed for CI programming.      The patient expressed understanding and agreement with this plan.    Roma Robbins, CCC-A, [facility]  Licensed Audiologist  [place] #[id number]    Enclosure: audiogram    Cc Izabella Pierson

## 2021-10-13 ENCOUNTER — OFFICE VISIT (OUTPATIENT)
Dept: AUDIOLOGY | Facility: CLINIC | Age: 61
End: 2021-10-13
Payer: COMMERCIAL

## 2021-10-13 DIAGNOSIS — H90.3 SENSORINEURAL HEARING LOSS, BILATERAL: Primary | ICD-10-CM

## 2021-10-13 PROCEDURE — V5241 DISPENSING FEE, MONAURAL: HCPCS | Performed by: AUDIOLOGIST

## 2021-10-13 PROCEDURE — V5011 HEARING AID FITTING/CHECKING: HCPCS | Performed by: AUDIOLOGIST

## 2021-10-13 PROCEDURE — V5020 CONFORMITY EVALUATION: HCPCS | Performed by: AUDIOLOGIST

## 2021-10-13 PROCEDURE — 92626 EVAL AUD FUNCJ 1ST HOUR: CPT | Performed by: AUDIOLOGIST-HEARING AID FITTER

## 2021-10-13 PROCEDURE — V5264 EAR MOLD/INSERT: HCPCS | Mod: RT | Performed by: AUDIOLOGIST

## 2021-10-13 NOTE — PROGRESS NOTES
AUDIOLOGY REPORT    SUBJECTIVE: Mark Adkins is a 61 year old male who was seen in the Audiology Clinic at [facility] for fitting of a right Phonak Cat Link M hearing aid. The patient has been seen previously in this clinic for audiologic assessment on 9/20/2021, and results indicated severe sensorineural hearing loss for the right ear. Elkin Varghese M.D. implanted Mark with a left Advanced Levlr HiRes 90k cochlear implant (CI) on 10/11/2010 due to severe to profound sensorineural hearing loss and lack of benefit from hearing aids. He is followed by Roma Steiner for the left CI. He recently upgraded to a left Cat CI M90 sound processor.      OBJECTIVE: The hearing aid conformity evaluation was completed.The hearing aid was placed and provided a good fit. Real-ear-probe-microphone measurements were completed on the [employer] system and were a good match to NAL-NL1 targets with soft sounds audible, moderate sounds comfortable, and loud sounds below discomfort. UCLs are verified through maximum power output measures and demonstrate appropriate limiting of loud inputs. Mark was oriented to proper hearing aid use, care, cleaning (no water, dry brush), batteries (size: 13, insertion/removal, toxicity, low-battery signal), aid insertion/removal, user booklet, warranty information, storage cases, and other hearing aid details. The patient confirmed understanding of hearing aid use and care and showed proper insertion of the hearing aids and batteries while in the office today. Mark reported good volume and sound quality.   Hearing aid was programmed as follows:  Start-up: AutoSense  Program 1: Calm   Program 2: Speech in Noise  Program button and volume control were activated and use was reviewed. The patient's CI is paired to his iPhone, so ensured he was receiving the Bluetooth signal to the hearing aid as well. The hearing aid was also paired to the Advanced  V2contact julio. Use was reviewed.     EAR FIT: Right  HEARING AID MODEL NAME: Phonak Cat Link M  HEARING AID STYLE: Behind-the-ear  EARMOLDS/TIP: The patient's previous canal lock earmold was used for the fitting today. He requested a new earmold, so a right earmold impression was taken without incident  SERIAL NUMBER: [id number]  WARRANTY END DATE: 11/6/2024    ASSESSMENT: A right Phonak Cat Link M hearing aid was fit today. Verification measures were performed. Mark was given a copy of the details on his hearing aid. No charge for the hearing aid, as it was his chosen accessory with the left Cat CI M90 sound processor upgrade kit. A new right earmold was ordered and billed today.      PLAN: The new right earmold will be mailed to the patient once it arrives at the clinic. He will return for hearing aid follow-up as needed. Please call this clinic with questions regarding today s appointment.    Roma Story, [facility]  Licensed Audiologist  [place] #[id number]

## 2021-10-24 ENCOUNTER — HEALTH MAINTENANCE LETTER (OUTPATIENT)
Age: 61
End: 2021-10-24

## 2021-11-10 ENCOUNTER — TELEPHONE (OUTPATIENT)
Dept: AUDIOLOGY | Facility: CLINIC | Age: 61
End: 2021-11-10
Payer: COMMERCIAL

## 2022-02-13 ENCOUNTER — HEALTH MAINTENANCE LETTER (OUTPATIENT)
Age: 62
End: 2022-02-13

## 2022-10-15 ENCOUNTER — HEALTH MAINTENANCE LETTER (OUTPATIENT)
Age: 62
End: 2022-10-15

## 2023-03-26 ENCOUNTER — HEALTH MAINTENANCE LETTER (OUTPATIENT)
Age: 63
End: 2023-03-26

## 2024-05-26 ENCOUNTER — HEALTH MAINTENANCE LETTER (OUTPATIENT)
Age: 64
End: 2024-05-26

## 2024-06-26 DIAGNOSIS — H90.5 SNHL (SENSORINEURAL HEARING LOSS): Primary | ICD-10-CM

## 2024-08-12 NOTE — PROGRESS NOTES
AUDIOLOGY REPORT    BACKGROUND INFORMATION: Dr. Elkin Varghese implanted Mark Adkins with a left  Advanced Bionics HiRes 90k cochlear implant (CI) on 10/11/10 due to severe to profound sensorineural hearing loss bilaterally and lack of benefit from hearing aids.  The patient is being seen in Audiology at the [facility] on 8/19/2024 for cochlear implant follow up and testing.  Today's visit was ordered by Dr. Samara Quach.      PATIENT REPORT: Mark has a left Advanced Bionics Cat CI M90 processor and a right Phonak Link M hearing aid.      Mark reports that when he is streaming that other people sometimes notice a static sound in background.      For his right hearing aid, he switched from a custom earpiece to a dome and has no feedback.  His tonehook cracked so he put one on from an old hearing aid but it does not fit well.  He was provided with a replacement of the correct size tonehook today.      Mark reduces the volume of his cochlear implant slightly and the volume of his hearing aid to a larger degree.  He would like both adjusted today.      Mark reports he may be moving to [place].  If he moves, he will complete a release of information form once he identifies a new clinic so records can be transferred.      METHOD: Speech perception testing is conducted at regular intervals to determine the degree of benefit the patient is obtaining from the cochlear implant. Tests are conducted using the cochlear implant without the benefit of lipreading. All tests are conducted in a sound-treated room. Perception of monosyllabic words and words in sentences are tested. Results usually show improvement over time. A decrease in performance indicates the need for re-programming of the prosthesis and/or replacement of components.     INTERVAL: 14 years     TEST RESULTS:  40 minutes were spent assessing the patient s auditory rehabilitation status.    Device(s) used for Testing:    Right ear: Cat Link M hearing aid    Left ear: Cat CI M90 behind-the-ear sound processor with 1 magnet (no irritation or discomfort)    Device check on AB remote julio was normal.  Listening check was normal and Tmic filter was changed.     Soundfield Aided Thresholds: Detection in normal to mild loss range with cochlear implant.  Stable re: 10/13/21     Unaided: Right profound rising to severe sloping to profound loss - stable re: 9/20/21.  Known sensorineural so did not retest bone.  Did not retest word recognition since it was previously 0%.    Left ear was not retested since there was previously no measurable hearing.      Tympanograms: Normal bilaterally    CNC Words Test:  The patient repeats 25 single syllable words, auditory only. The words are presented at 60 dB A (conversational level) delivered from a CD player.    Preoperative Performance:  Bilaterally aided: 6%    3 months: 44%  3 years: 68% CI, 60% Bimodal  4 years: 80% CI, Did not test (DNT) bimodal as hearing aid is broken  5 years: 92% CI, DNT bimodal as hearing aid is broken  6 1/2 years: 96% CI, DNT bimodal as hearing aid is broken  9 years: 92% CI, DNT bimodal as right ear isn't aided  11 years: 84% CI, DNT bimodal today since patient is being fit with Cat Link M later today.   14 years (today): 84% CI - stable, DNT bimodal due to high score with CI alone    The Hearing in Noise Test (HINT):  The patient repeats 20 sentences, auditory only.   The sentences are presented in each condition at 60 dB A (conversational level) delivered from a CD player.     Preoperative Performance:  Bilaterally aided: 3%    3 months: Cochlear implant - 87%  1-1/2 years (when upgraded to Clear Voice): CI - 95% in quiet, 21% without Clear Voice & 30% with Clear Voice at +5 dB signal to noise ratio (SNR) - noise conditions with continuous speech shaped noise  3 years: CI - 94% quiet, 92% +10 dB SNR, 26% +5 dB SNR; Bimodal - 66% +5 dB SNR (all with Clear Voice) -  noise conditions with continuous speech shaped noise  4 years: CI - 98% quiet, 93% +10 dB SNR, 49% +5 dB SNR - noise conditions with gated CD noise; Bimodal - DNT since hearing aid is broken.  5 years: CI - 96% quiet; Bimodal - DNT since hearing aid is broken.  Did not test noise as switched to AzBio, a more difficult test.  6 1/2 years: Did not test - used AzBio (see below)  9 years: Did not test - used AzBio (see below)  11 years: Did not test - used AzBio (see below)  14 years (today): Did not test - used AzBio (see below)    AzBio Sentences Test: The patient repeats 20 sentences, auditory only. The sentences are presented at 60 dB A (conversational level) delivered from a CD player.     Preoperative Performance: Did not test    CI only:   5 years: 85% quiet; 47% +10 dB SNR  6 1/2 years: 85% quiet; 69% +10 dB SNR  9 years: 87% quiet; 62% +10 dB SNR  11 years: 87% quiet; 59% +10 dB SNR  14 years (today): 88% quiet; 60% +10 dB SNR - both stable    Bimodal:   14 years (today): DNT quiet; 75% +10 dB SNR - bimodal advantage     FITTING SESSION: Dr. Samara Quach, cochlear implant surgeon, ordered today's appointment. The patient came to the clinic for adjustment to the programs in the external speech processor and for assessment of the external components of the cochlear implant system. These components provide power and data to the internal device. Sound is only heard once the external portion is activated. Postoperative treatment, including device fitting and adjustment, audiologic assessments, and training are required at regular intervals. Testing can include electropsychophysical measures of threshold, comfort, and loudness balancing, which are completed to update the program.    Processor type: Cat CI M90, Backup Cat CI Q90   Headpiece type: Slim HP in M90, UHP in Q90  Magnet strength: 1 In M90, 1 standard + 1 thin inside well for Q90    TEST RESULTS:   Electrode Impedances: Grounding is on ring band for his  programs.  This was changed to case for impedance testing.  Impedances were normal and stable.  Grounding was changed back to ring band for his programming (due to past issues with lock unless pressure on head when using case ground).     Neural Response Testing: Did not test  Facial Stimulation: Absent  Tinnitus: Absent  Balance Problems: Absent  Pain/Discomfort: Absent  Strategies: HiResolution S 120 in [place] and PSP, [place] S in Cat CI Q90 and Cat CI M90  Clear Voice medium in [place] and Cat (not available in PSP).     Programs in left Cat CI M90 ([place] S):  Start-up program: AutoSense  Manual programs:    1. Calm   2. Speech in noise  All programs use IDR 65, Clear Voice medium and WindBlock.    Grounding is on ring band with manual RF of 5 as carried over from [employer] (recommendations from [employer]).    Programs in right Cat Link M:  Start-up program: AutoSense  Manual programs:    1. Calm   2. Speech in noise    Programs in Cat CI Q90 ([place] S): Not changed today   1. (85) Everyday program, IDR 65, Tmic only   2. (85) Manual UltraZoom, IDR 65, Processor colt  3. (85) EchoBlock, IDR 65, Tmic only  All programs use Clear Voice medium and WindBlock.    Grounding is on ring band with manual RF of 5 as carried over from [employer] (recommendations from [employer]).      COMMENTS:     In left Cat CI M90, the software was updated in Target CI 1.5 to allow future remote support if needed.  Impedances were checked (grounding needed to be changed from ring band to case for impedance check and then it was changed back) and were stable and normal.  Datalogging showed 14.2 hours of use per day for the hearing aid and 15.7 hours of use per day for the CI.     In the left [place] CI, most comfortable (M) levels were globally decreased 4 units to achieve comfort.      In the right Cat Link M hearing aid, since the patient is decreasing the volume on the AB Remote julio, gain was decreased globally  in 1 dB increments until comfort was achieved.  Final settings were 5 dB softer than arrival settings.  No charge for hearing aid adjustments, since hearing aid is in warranty.     Discussed patient's cochlear implant equipment issues.  Decision was made to submit RMA for slim HP and Tmic2 large to see if this helps with the intermittent static.  If this does not help, he will notify the clinic or [employer] so the processor can also be exchanged.  The processor was fit 9/20/21 so it in warranty for 1 more month.  Slim HP and Tmic2 were showing out of warranty online so I called [employer] and they agreed this was an error and would be fixed by tomorrow.  They asked that I email customer service so they submit RMA once date is corrected.  Patient would also like 3.5 inch cable instead of 4.25 inch and I made that request to [employer] as well.      Patient reported that he was told by [employer] he was starting with straight batteries but that he would later receive two curved batteries.  They were never received.  I sent a message to [employer] to see if these can still be sent.  Battery life is decreased with current batteries, so if [employer] will not be sending the curved batteries, he will contact Customer Service to place a battery order through insurance.  I will update the patient once I hear back from [employer].      SUMMARY AND RECOMMENDATIONS: Mark has been using his cochlear implant for 14 years.      Audibility with left CI and speech perception scores with CI are stable.  Performance is above average.  There is a bimodal advantage in noise. CI impedances are stable and normal.  Programming changes were made to lower the volume of his CI and his hearing aid.  Unaided hearing is stable in right ear.  Tympanograms are normal in both ears.    Patient should return for testing approximately every 1-2 years or sooner if changes. He reports he may be moving to  [place] and if that is the case, he will sign a release of information form so records can be transferred once he locates a new clinic.        I have sent messages to Customer Service to address his equipment needs as discussed above (RMA Tmic and slim HP; check on curved battery status).  I will notify the patient on what I hear back from Customer Service.  Once the patient tries the new Tmic and slim HP, he will contact the clinic or [employer] if static continues, so the processor can also be exchanged if needed.     Patient will continue to follow up in ENT if any medical concerns arise with his ears or cochlear implant.     The patient expressed understanding and agreement with this plan.    Milton Robbins., CCC-A, [facility]  Licensed Audiologist  [place] #[id number]

## 2024-08-19 ENCOUNTER — OFFICE VISIT (OUTPATIENT)
Dept: AUDIOLOGY | Facility: CLINIC | Age: 64
End: 2024-08-19
Payer: COMMERCIAL

## 2024-08-19 DIAGNOSIS — H90.3 SENSORINEURAL HEARING LOSS (SNHL) OF BOTH EARS: Primary | ICD-10-CM

## 2024-08-19 PROCEDURE — 92604 REPROGRAM COCHLEAR IMPLT 7/>: CPT | Performed by: AUDIOLOGIST-HEARING AID FITTER

## 2024-08-19 PROCEDURE — 92567 TYMPANOMETRY: CPT | Mod: XU | Performed by: AUDIOLOGIST-HEARING AID FITTER

## 2024-08-19 PROCEDURE — 92626 EVAL AUD FUNCJ 1ST HOUR: CPT | Mod: XU | Performed by: AUDIOLOGIST-HEARING AID FITTER

## 2024-08-20 ENCOUNTER — TELEPHONE (OUTPATIENT)
Dept: AUDIOLOGY | Facility: CLINIC | Age: 64
End: 2024-08-20
Payer: COMMERCIAL

## 2024-08-20 NOTE — TELEPHONE ENCOUNTER
Email communication with patient:     Sergio Flores,   Here's the update I got back from AB (see below).  So looks like everything is on its way, including batteries, so no need to order new ones.  If the static continues with the new cable/headpiece and Tmic, please let AB or I know before warranty expires in Sept so we can exchange the processor as well.      Thanks!     Roma Robbins, CCC-A, [facility]  Doctor of Audiology     [facility]  Audiology Clinic  4th Floor, Mail Code [address]  [address] [place] [address]  [email or web address]  [phone]  Appointments: [phone]  Fax: [phone]    From: connie reyes via [employer] Secure Mail <[email or web address]>   Sent: 2024 4:33 PM  To: Rosangela Rojas <[email or web address]>; [email or web address]  Subject: RE:  (secure)      Hello,     Done.  For the faulty Slim HP and M T-Luis large, I've processed RMA number [id number].    For 2x M battery medium silver gray, I've processed order number [id number].  Both orders shipping to Mr. Mark Adkins.    Thank you  Rodrigo MCDERMOTT [facility]     From: Rosangela Rojas  Sent: Mon, 19 Aug 2024 20:48:23 +0000  To: ([email or web address]), [employer] Upgrades ([email or web address])  Cc:  Subject:  (secure)        Hi Customer Service and Upgrades Team -     A couple things re: Mark Adkins ( 58):     I called customer service today because his processor was under warranty but the Tmic and slim HP were not showing as in warranty online.  Fitting was 21.  I was told they should have the date corrected by tomorrow, so we can submit RMAs.  Can you help with two RMA: 1. Slim HP (currently 4.25 but wondering if can switch to 3.5 inch - I am unsure how to change length online). 2. Needs Tmic2 large exchanged - static sound.  Patient reports that when he upgraded, he was told he would get 2  curved medium batteries (silver) at a later date and was shipped two standard to start.  He never received the curved.  Customer service call today said I may need to get input from upgrades team on whether this will be honored.  Please let me know and I'll notify patient.     Patient's shipping address:  [facility] [place] [address]        Thanks!           Roma Robbins, CCC-A, [facility]  Doctor of Audiology     [facility]  Audiology Clinic  4th Floor, Mail Code [address]  [address] [place] [address]  [email or web address]  [phone]  Appointments: [phone]  Fax: [phone]

## 2024-09-03 ENCOUNTER — TELEPHONE (OUTPATIENT)
Dept: AUDIOLOGY | Facility: CLINIC | Age: 64
End: 2024-09-03
Payer: COMMERCIAL

## 2024-09-03 NOTE — TELEPHONE ENCOUNTER
Email from patient.  He received new Tmic and slim HP but still has static on phone calls and when streaming music.  He was advised to contact [employer] to request replacement processor.  He will update clinic as to whether this resolves his concerns.       Roma Robbins, CCC-A, [facility]  Licensed Audiologist  [place] #[id number]

## 2024-09-05 NOTE — TELEPHONE ENCOUNTER
[facility] [id number] completed for sound processor.       Roma Robbins, CCC-A, [facility]  Licensed Audiologist  [place] #[id number]

## 2024-12-21 ENCOUNTER — HEALTH MAINTENANCE LETTER (OUTPATIENT)
Age: 64
End: 2024-12-21